# Patient Record
Sex: MALE | Race: WHITE | NOT HISPANIC OR LATINO
[De-identification: names, ages, dates, MRNs, and addresses within clinical notes are randomized per-mention and may not be internally consistent; named-entity substitution may affect disease eponyms.]

---

## 2018-05-23 ENCOUNTER — TRANSCRIPTION ENCOUNTER (OUTPATIENT)
Age: 53
End: 2018-05-23

## 2018-08-17 ENCOUNTER — OUTPATIENT (OUTPATIENT)
Dept: OUTPATIENT SERVICES | Facility: HOSPITAL | Age: 53
LOS: 1 days | End: 2018-08-17
Payer: COMMERCIAL

## 2018-08-17 ENCOUNTER — APPOINTMENT (OUTPATIENT)
Dept: ULTRASOUND IMAGING | Facility: CLINIC | Age: 53
End: 2018-08-17

## 2018-08-17 DIAGNOSIS — Z00.8 ENCOUNTER FOR OTHER GENERAL EXAMINATION: ICD-10-CM

## 2018-08-17 PROCEDURE — 76882 US LMTD JT/FCL EVL NVASC XTR: CPT

## 2018-08-17 PROCEDURE — 76882 US LMTD JT/FCL EVL NVASC XTR: CPT | Mod: 26,LT

## 2019-01-28 ENCOUNTER — TRANSCRIPTION ENCOUNTER (OUTPATIENT)
Age: 54
End: 2019-01-28

## 2019-05-31 ENCOUNTER — TRANSCRIPTION ENCOUNTER (OUTPATIENT)
Age: 54
End: 2019-05-31

## 2019-08-21 ENCOUNTER — RESULT REVIEW (OUTPATIENT)
Age: 54
End: 2019-08-21

## 2020-06-05 ENCOUNTER — EMERGENCY (EMERGENCY)
Facility: HOSPITAL | Age: 55
LOS: 1 days | Discharge: HOME | End: 2020-06-05
Attending: EMERGENCY MEDICINE | Admitting: EMERGENCY MEDICINE
Payer: COMMERCIAL

## 2020-06-05 VITALS
TEMPERATURE: 98 F | SYSTOLIC BLOOD PRESSURE: 163 MMHG | HEART RATE: 86 BPM | DIASTOLIC BLOOD PRESSURE: 97 MMHG | WEIGHT: 195.11 LBS | RESPIRATION RATE: 18 BRPM | OXYGEN SATURATION: 99 % | HEIGHT: 68 IN

## 2020-06-05 VITALS
SYSTOLIC BLOOD PRESSURE: 150 MMHG | HEART RATE: 80 BPM | DIASTOLIC BLOOD PRESSURE: 90 MMHG | RESPIRATION RATE: 18 BRPM | OXYGEN SATURATION: 98 % | TEMPERATURE: 98 F

## 2020-06-05 PROCEDURE — 99283 EMERGENCY DEPT VISIT LOW MDM: CPT

## 2020-06-05 PROCEDURE — 99285 EMERGENCY DEPT VISIT HI MDM: CPT

## 2020-06-05 RX ORDER — ESCITALOPRAM OXALATE 10 MG/1
0 TABLET, FILM COATED ORAL
Qty: 0 | Refills: 0 | DISCHARGE

## 2020-06-05 NOTE — ED PROVIDER NOTE - PATIENT PORTAL LINK FT
You can access the FollowMyHealth Patient Portal offered by [facility] by registering at the following website: [email or web address]. By joining [facility]’s FollowMyHealth portal, you will also be able to view your health information using other applications (apps) compatible with our system.

## 2020-06-05 NOTE — ED PROVIDER NOTE - CARE PROVIDER_API CALL
Benjamin Lama (DO)  Internal Medicine  [address] [place] [address]  Phone: [phone]  Fax: [phone]  Follow Up Time: 1-3 Days    João George  INTERNAL MEDICINE  [address] [place] [address]  Phone: [phone]  Fax: [phone]  Follow Up Time: 1-3 Days

## 2020-06-05 NOTE — ED PROVIDER NOTE - ENMT, MLM
Airway patent, Nasal mucosa clear. Mouth with normal mucosa. Throat has no vesicles, no oropharyngeal exudates and uvula is midline. No throat irrtation or erythema observed, handles secretions w/o diffcutly

## 2020-06-05 NOTE — ED PROVIDER NOTE - CLINICAL SUMMARY MEDICAL DECISION MAKING FREE TEXT BOX
Presents with accidental ingestion of bleach, bleach was diluted and small amount. Drinking plenty of fluids, no difficulty swallowing, no irritation or erythema observed on exam. Recommend to continue fluids and supportive care, gave names for GI follow up. Appropriate for DC.

## 2020-06-05 NOTE — ED PROVIDER NOTE - PROVIDER TOKENS
PROVIDER:[TOKEN:[[id number]],FOLLOWUP:[1-3 Days]],PROVIDER:[TOKEN:[[id number]],FOLLOWUP:[1-3 Days]]

## 2020-06-05 NOTE — ED ADULT NURSE NOTE - OBJECTIVE STATEMENT
drank mak large sip of 1/2 bleach, 1/2 water mixture "accidently" 30 mins ago. spit out mixture. presents c/o burning sensation to throat. voice clear, airway patent. bilat lungs clear. states called poison control and advised to drink water. drank 5 glasses of water and ate one cracker. Appears no acute distress.

## 2020-06-05 NOTE — ED ADULT TRIAGE NOTE - CHIEF COMPLAINT QUOTE
drank mak large sip of 1/2 bleach, 1/2 water accidently. spit out mixture. presents c/o burning sensation to throat. voice clear, airway patent. bilat lungs clear. drank mak large sip of 1/2 bleach, 1/2 water mixture "accidently" 30 mins ago. spit out mixture. presents c/o burning sensation to throat. voice clear, airway patent. bilat lungs clear. states called poison control and advised to drink water. drank 5 glasses of water and ate one cracker. Appears no acute distress.

## 2020-06-05 NOTE — ED PROVIDER NOTE - OBJECTIVE STATEMENT
54 year old male with hx of HLD and Depression presents with accidental ingestion of bleach about 30 minutes ago at home. States bleach was diluted half with water, took a small sip, recognized it was bleach and spit it out. Called poison control an was recommended to drink fluids and supportive care. Due to continued irritation of throat came to the ED. Pt drank 5 glasses of water. Denies any difficulty swallowing, just reports throat feels irritated. No chest pain or SOB. PCP- Kendall

## 2020-06-05 NOTE — ED ADULT NURSE NOTE - NSIMPLEMENTINTERV_GEN_ALL_ED
Implemented All Universal Safety Interventions:  [place] to call system. Call bell, personal items and telephone within reach. Instruct patient to call for assistance. Room bathroom lighting operational. Non-slip footwear when patient is off stretcher. Physically safe environment: no spills, clutter or unnecessary equipment. Stretcher in lowest position, wheels locked, appropriate side rails in place.

## 2020-06-05 NOTE — ED PROVIDER NOTE - PROGRESS NOTE DETAILS
Scribe AS for Dr. De Leon: 55 y/o male with a PMHx of HLD presents to the ED for accidently ingesting a small amount of bleach solution used for cleaning. Pt thought it was water in a water bottle, immediately spit it out. Unsure if he swallowed any. Afterwards, called poison control and they referred him to the ER for further eval. Denies fever, cough, SOB, chest pain, vomiting or other symptoms. PCP- Celestine. Already drank several glasses of water as instructed by poison control. PE: Vital signs stable, afebrile, no distress, oropharynx clear, no irritation or burns. no stridor, lungs clear, heart normal, abd s/nt. skin no burns. Plan is reassurance, continue drinking water, follow up with GI if symptoms develop.

## 2020-06-05 NOTE — ED PROVIDER NOTE - NSFOLLOWUPINSTRUCTIONS_ED_ALL_ED_FT
drink plenty of fluids  follow up with GI for any worsening symptoms or diff swallowing, referrals provided

## 2020-06-19 ENCOUNTER — TRANSCRIPTION ENCOUNTER (OUTPATIENT)
Age: 55
End: 2020-06-19

## 2020-11-09 ENCOUNTER — TRANSCRIPTION ENCOUNTER (OUTPATIENT)
Age: 55
End: 2020-11-09

## 2020-11-18 ENCOUNTER — TRANSCRIPTION ENCOUNTER (OUTPATIENT)
Age: 55
End: 2020-11-18

## 2020-11-27 ENCOUNTER — TRANSCRIPTION ENCOUNTER (OUTPATIENT)
Age: 55
End: 2020-11-27

## 2020-12-06 ENCOUNTER — TRANSCRIPTION ENCOUNTER (OUTPATIENT)
Age: 55
End: 2020-12-06

## 2020-12-10 ENCOUNTER — TRANSCRIPTION ENCOUNTER (OUTPATIENT)
Age: 55
End: 2020-12-10

## 2021-06-25 ENCOUNTER — TRANSCRIPTION ENCOUNTER (OUTPATIENT)
Age: 56
End: 2021-06-25

## 2021-10-15 ENCOUNTER — TRANSCRIPTION ENCOUNTER (OUTPATIENT)
Age: 56
End: 2021-10-15

## 2022-09-13 ENCOUNTER — NON-APPOINTMENT (OUTPATIENT)
Age: 57
End: 2022-09-13

## 2022-11-04 ENCOUNTER — APPOINTMENT (OUTPATIENT)
Dept: OTOLARYNGOLOGY | Facility: CLINIC | Age: 57
End: 2022-11-04
Payer: COMMERCIAL

## 2022-11-04 ENCOUNTER — NON-APPOINTMENT (OUTPATIENT)
Age: 57
End: 2022-11-04

## 2022-11-04 VITALS
HEIGHT: 68 IN | HEART RATE: 64 BPM | WEIGHT: 194 LBS | BODY MASS INDEX: 29.4 KG/M2 | SYSTOLIC BLOOD PRESSURE: 137 MMHG | DIASTOLIC BLOOD PRESSURE: 87 MMHG

## 2022-11-04 PROCEDURE — 31575 DIAGNOSTIC LARYNGOSCOPY: CPT

## 2022-11-04 NOTE — REVIEW OF SYSTEMS
[Sneezing] : sneezing [Post Nasal Drip] : post nasal drip [Problem Snoring] : problem snoring [Throat Clearing] : throat clearing [Cough] : cough [Heartburn] : heartburn [Negative] : Heme/Lymph [Patient Intake Form Reviewed] : Patient intake form was reviewed

## 2022-11-04 NOTE — HISTORY OF PRESENT ILLNESS
[de-identified] : COUGH FOR MORE THAN A YEAR\par SEASONAL VARIATION\par HX OF COVID 2020\par NON PRODUCTIVE COUGH\par NO WEIGHT LOSS\par NO SMOKING\par MEDICAL HX REVIEWED

## 2022-11-04 NOTE — ASSESSMENT
[FreeTextEntry1] : COUGH VARIANT ASTHMA DISCUSSED\par ALLERGY DISCUSSED\par PFT DISCUSSED\par NO RECENT IMAGE STUDIES\par AVISED PULMONARY AND ALLERGY EVALUATION\par GERD DIET / INSTRUCTIONS DISCUSSED\par INCREASE WATER INTAKE\par EUCALYPTUS HUMIDIFIER\par GARGLING WITH SALT AND WATER\par F/U ONE MONTH ; AFTER ABOVE

## 2022-11-04 NOTE — PHYSICAL EXAM
[] : septum deviated bilaterally [de-identified] : MILD MUCOSAL IRRIATTION [Normal] : mucosa is normal [Midline] : trachea located in midline position

## 2022-11-15 ENCOUNTER — NON-APPOINTMENT (OUTPATIENT)
Age: 57
End: 2022-11-15

## 2022-11-16 ENCOUNTER — APPOINTMENT (OUTPATIENT)
Dept: PULMONOLOGY | Facility: CLINIC | Age: 57
End: 2022-11-16
Payer: COMMERCIAL

## 2022-11-16 ENCOUNTER — LABORATORY RESULT (OUTPATIENT)
Age: 57
End: 2022-11-16

## 2022-11-16 VITALS
HEIGHT: 60 IN | WEIGHT: 195 LBS | TEMPERATURE: 98.2 F | DIASTOLIC BLOOD PRESSURE: 85 MMHG | HEART RATE: 66 BPM | BODY MASS INDEX: 38.28 KG/M2 | SYSTOLIC BLOOD PRESSURE: 128 MMHG | OXYGEN SATURATION: 99 %

## 2022-11-16 VITALS
TEMPERATURE: 97.9 F | RESPIRATION RATE: 16 BRPM | HEIGHT: 68 IN | SYSTOLIC BLOOD PRESSURE: 130 MMHG | DIASTOLIC BLOOD PRESSURE: 87 MMHG | BODY MASS INDEX: 29.7 KG/M2 | WEIGHT: 196 LBS | HEART RATE: 76 BPM

## 2022-11-16 DIAGNOSIS — K21.9 GASTRO-ESOPHAGEAL REFLUX DISEASE W/OUT ESOPHAGITIS: ICD-10-CM

## 2022-11-16 DIAGNOSIS — R05.9 COUGH, UNSPECIFIED: ICD-10-CM

## 2022-11-16 PROCEDURE — 94726 PLETHYSMOGRAPHY LUNG VOLUMES: CPT

## 2022-11-16 PROCEDURE — 94729 DIFFUSING CAPACITY: CPT

## 2022-11-16 PROCEDURE — ZZZZZ: CPT

## 2022-11-16 PROCEDURE — 99204 OFFICE O/P NEW MOD 45 MIN: CPT | Mod: 25

## 2022-11-16 PROCEDURE — 94060 EVALUATION OF WHEEZING: CPT

## 2022-11-16 NOTE — DISCUSSION/SUMMARY
[FreeTextEntry1] : Patient's pulmonary function testing is normal.  There is no evidence of bronchodilator response.  No evidence of obstruction or asthma.\par Clinically, patient's symptoms are also not consistent with cough variant asthma.\par His symptoms appear to be triggered by food and drink that exacerbate reflux as well, such as coffee and pineapple.\par \par I will check asthma labs today for completion.\par \par For now, I advised a 1 month trial of strict GERD dietary precautions in combination with an a.m. PPI and p.m. H2 blocker.  And of course follow-up with the GI.

## 2022-11-16 NOTE — HISTORY OF PRESENT ILLNESS
[Former] : former [TextBox_4] : 57-year-old male referred for consultation by ENT for evaluation for possible cough variant asthma.\par \par Patient is presenting with a complaint of cough for more than a year.  The cough is actually throat clearing, more than an actual cough.  There are days that it does not occur at all, and days that it is very frequent.  Although he notes that it happens more frequently in the winter than in the summer, it is not triggered by actual cold air.  It is not triggered by exercise or exertion.  Certain triggers are clearly related to foods, such as coffee, pineapple, and other acidic foods.  He denies coughing while sleeping, it certainly does not wake him up.  Patient had a laryngoscopy that showed laryngeal irritation.  He also had previously had an EGD and colonoscopy and was told that he had reflux.  He reports that he tried Prilosec for a few weeks, but it did not help.  He did not however change his diet during this trial.  He has also tried Flonase for a couple of weeks, because he felt he had a postnasal drip, which did not help.  He says he has a long history of a deviated septum as well.  He said his wife tells him to avoid bread, in case it is a celiac issue. (Although he suspects this may just be because she wants him to lose weight).\par \par he has a remote tobacco history, but not within the last 30 years.\par \par He does not have any known allergies, or hayfever symptoms\par \par \par The only medications he is currently taking are Lipitor and Lexapro

## 2022-11-16 NOTE — PHYSICAL EXAM
[No Acute Distress] : no acute distress [Normal Oropharynx] : normal oropharynx [Normal Appearance] : normal appearance [No Neck Mass] : no neck mass [Normal Rate/Rhythm] : normal rate/rhythm [Normal S1, S2] : normal s1, s2 [No Murmurs] : no murmurs [No Resp Distress] : no resp distress [Clear to Auscultation Bilaterally] : clear to auscultation bilaterally [No Abnormalities] : no abnormalities [Benign] : benign [Normal Gait] : normal gait [No Clubbing] : no clubbing [No Cyanosis] : no cyanosis [No Edema] : no edema [FROM] : FROM [Normal Color/ Pigmentation] : normal color/ pigmentation [No Focal Deficits] : no focal deficits [Oriented x3] : oriented x3 [Normal Affect] : normal affect [TextBox_2] : Frequent clearing of his throat

## 2022-11-16 NOTE — CONSULT LETTER
[Consult Letter:] : I had the pleasure of evaluating your patient, [unfilled]. [FreeTextEntry2] : Dr. Judit Mae

## 2022-11-21 ENCOUNTER — NON-APPOINTMENT (OUTPATIENT)
Age: 57
End: 2022-11-21

## 2022-11-21 LAB
A ALTERNATA IGE QN: <0.1 KUA/L
A FUMIGATUS IGE QN: <0.1 KUA/L
BASOPHILS # BLD AUTO: 0.05 K/UL
BASOPHILS NFR BLD AUTO: 0.7 %
C ALBICANS IGE QN: <0.1 KUA/L
C HERBARUM IGE QN: <0.1 KUA/L
CAT DANDER IGE QN: <0.1 KUA/L
CELIACPAN: NORMAL
COMMON RAGWEED IGE QN: <0.1 KUA/L
D FARINAE IGE QN: 0.22 KUA/L
D PTERONYSS IGE QN: 0.13 KUA/L
DEPRECATED A ALTERNATA IGE RAST QL: 0
DEPRECATED A FUMIGATUS IGE RAST QL: 0
DEPRECATED C ALBICANS IGE RAST QL: 0
DEPRECATED C HERBARUM IGE RAST QL: 0
DEPRECATED CAT DANDER IGE RAST QL: 0
DEPRECATED COMMON RAGWEED IGE RAST QL: 0
DEPRECATED D FARINAE IGE RAST QL: NORMAL
DEPRECATED D PTERONYSS IGE RAST QL: NORMAL
DEPRECATED DOG DANDER IGE RAST QL: NORMAL
DEPRECATED M RACEMOSUS IGE RAST QL: NORMAL
DEPRECATED ROACH IGE RAST QL: NORMAL
DEPRECATED TIMOTHY IGE RAST QL: 0
DEPRECATED WHITE OAK IGE RAST QL: 0
DOG DANDER IGE QN: 0.14 KUA/L
EOSINOPHIL # BLD AUTO: 0.09 K/UL
EOSINOPHIL NFR BLD AUTO: 1.2 %
HCT VFR BLD CALC: 51.7 %
HGB BLD-MCNC: 16.2 G/DL
IMM GRANULOCYTES NFR BLD AUTO: 0.3 %
LYMPHOCYTES # BLD AUTO: 2.36 K/UL
LYMPHOCYTES NFR BLD AUTO: 31.9 %
M RACEMOSUS IGE QN: 0.14 KUA/L
MAN DIFF?: NORMAL
MCHC RBC-ENTMCNC: 26 PG
MCHC RBC-ENTMCNC: 31.3 GM/DL
MCV RBC AUTO: 83.1 FL
MONOCYTES # BLD AUTO: 0.41 K/UL
MONOCYTES NFR BLD AUTO: 5.5 %
NEUTROPHILS # BLD AUTO: 4.47 K/UL
NEUTROPHILS NFR BLD AUTO: 60.4 %
PLATELET # BLD AUTO: 231 K/UL
RBC # BLD: 6.22 M/UL
RBC # FLD: 16.7 %
ROACH IGE QN: 0.12 KUA/L
TIMOTHY IGE QN: <0.1 KUA/L
TOTAL IGE SMQN RAST: 72 KU/L
TTG IGA SER IA-ACNC: <1.2 U/ML
TTG IGA SER-ACNC: NEGATIVE
TTG IGG SER IA-ACNC: <1.2 U/ML
TTG IGG SER IA-ACNC: NEGATIVE
WBC # FLD AUTO: 7.4 K/UL
WHITE OAK IGE QN: <0.1 KUA/L

## 2022-12-23 ENCOUNTER — RX RENEWAL (OUTPATIENT)
Age: 57
End: 2022-12-23

## 2023-03-06 ENCOUNTER — RX RENEWAL (OUTPATIENT)
Age: 58
End: 2023-03-06

## 2023-03-06 RX ORDER — MONTELUKAST 10 MG/1
10 TABLET, FILM COATED ORAL
Qty: 30 | Refills: 0 | Status: ACTIVE | COMMUNITY
Start: 2022-11-07 | End: 1900-01-01

## 2023-10-12 NOTE — ED ADULT NURSE NOTE - MODE OF DISCHARGE
Dupixent Pregnancy And Lactation Text: This medication likely crosses the placenta but the risk for the fetus is uncertain. This medication is excreted in breast milk. Ambulatory

## 2024-04-03 NOTE — ED PROVIDER NOTE - CARE PLAN
monitored anesthesia care (MAC) Principal Discharge DX:	Ingestion of bleach, accidental or unintentional, initial encounter

## 2025-01-20 ENCOUNTER — NON-APPOINTMENT (OUTPATIENT)
Age: 60
End: 2025-01-20

## 2025-02-09 ENCOUNTER — NON-APPOINTMENT (OUTPATIENT)
Age: 60
End: 2025-02-09